# Patient Record
Sex: FEMALE | Race: WHITE | NOT HISPANIC OR LATINO | URBAN - METROPOLITAN AREA
[De-identification: names, ages, dates, MRNs, and addresses within clinical notes are randomized per-mention and may not be internally consistent; named-entity substitution may affect disease eponyms.]

---

## 2018-01-11 ENCOUNTER — OFFICE (OUTPATIENT)
Dept: URBAN - METROPOLITAN AREA CLINIC 11 | Facility: CLINIC | Age: 78
End: 2018-01-11

## 2018-01-11 VITALS
HEIGHT: 65 IN | HEART RATE: 138 BPM | DIASTOLIC BLOOD PRESSURE: 74 MMHG | SYSTOLIC BLOOD PRESSURE: 147 MMHG | WEIGHT: 217 LBS

## 2018-01-11 DIAGNOSIS — I48.91 UNSPECIFIED ATRIAL FIBRILLATION: ICD-10-CM

## 2018-01-11 DIAGNOSIS — K30 FUNCTIONAL DYSPEPSIA: ICD-10-CM

## 2018-01-11 DIAGNOSIS — E66.9 OBESITY, UNSPECIFIED: ICD-10-CM

## 2018-01-11 DIAGNOSIS — K21.9 GASTRO-ESOPHAGEAL REFLUX DISEASE WITHOUT ESOPHAGITIS: ICD-10-CM

## 2018-01-11 DIAGNOSIS — R19.4 CHANGE IN BOWEL HABIT: ICD-10-CM

## 2018-01-11 DIAGNOSIS — R11.0 NAUSEA: ICD-10-CM

## 2018-01-11 DIAGNOSIS — K57.30 DIVERTICULOSIS OF LARGE INTESTINE WITHOUT PERFORATION OR ABS: ICD-10-CM

## 2018-01-11 DIAGNOSIS — E11.9 TYPE 2 DIABETES MELLITUS WITHOUT COMPLICATIONS: ICD-10-CM

## 2018-01-11 PROCEDURE — 99203 OFFICE O/P NEW LOW 30 MIN: CPT | Performed by: INTERNAL MEDICINE

## 2018-01-11 NOTE — SERVICENOTES
Will have to consider the use of Reglan.  Am attempting to diagnose and treat by noninvasive means as patient is at high risk for endoscopy for cardiovascular reasons.

## 2018-01-11 NOTE — SERVICEHPINOTES
77-year-old female diagnosed with atrial fibrillation in November of 2017 which is about the time that she began to notice daily episodes of indigestion and nausea without emesis.  Her symptoms are at their lowest severity in the morning but gradually worsens as the day progresses.  She has been having normal stools except that she has noticed increased frequency of constipation requiring OTC laxatives.  Her indigestion and nausea does not seem to be affected by meals but is improved with passage of stool.  There has been no fever, chills, dysphagia, melena or hematochezia.The patient has known chronic renal insufficiency, hypertension, diabetes mellitus, hyperlipidemia  as well as her recent diagnosis of atrial fibrillation.  She is on Eliquis and recently had metoprolol discontinued.